# Patient Record
Sex: MALE | Race: WHITE | NOT HISPANIC OR LATINO | URBAN - METROPOLITAN AREA
[De-identification: names, ages, dates, MRNs, and addresses within clinical notes are randomized per-mention and may not be internally consistent; named-entity substitution may affect disease eponyms.]

---

## 2020-06-22 ENCOUNTER — OFFICE VISIT (OUTPATIENT)
Dept: URBAN - METROPOLITAN AREA CLINIC 63 | Facility: CLINIC | Age: 70
End: 2020-06-22

## 2020-10-05 ENCOUNTER — OFFICE VISIT (OUTPATIENT)
Dept: URBAN - METROPOLITAN AREA CLINIC 121 | Facility: CLINIC | Age: 70
End: 2020-10-05

## 2021-06-22 ENCOUNTER — LAB OUTSIDE AN ENCOUNTER (OUTPATIENT)
Dept: URBAN - METROPOLITAN AREA CLINIC 121 | Facility: CLINIC | Age: 71
End: 2021-06-22

## 2021-07-12 LAB
ABSOLUTE BASOPHILS: (no result)
ABSOLUTE EOSINOPHILS: (no result)
ABSOLUTE LYMPHOCYTES: (no result)
ABSOLUTE MONOCYTES: (no result)
ABSOLUTE NEUTROPHILS: (no result)
ALBUMIN/GLOBULIN RATIO: (no result)
ALBUMIN: (no result)
ALKALINE PHOSPHATASE: (no result)
ALT: (no result)
AST: (no result)
BASOPHILS: (no result)
BILIRUBIN, TOTAL: (no result)
BUN/CREATININE RATIO: (no result)
C-REACTIVE PROTEIN: (no result)
CALCIUM: (no result)
CARBON DIOXIDE: (no result)
CHLORIDE: (no result)
CREATININE: (no result)
EGFR AFRICAN AMERIC: (no result)
EGFR NON-AFR. AMERI: (no result)
EOSINOPHILS: (no result)
GLOBULIN: (no result)
GLUCOSE: (no result)
HEMATOCRIT: (no result)
HEMOGLOBIN: (no result)
LYMPHOCYTES: (no result)
MCH: (no result)
MCHC: (no result)
MCV: (no result)
MONOCYTES: (no result)
MPV: (no result)
NEUTROPHILS: (no result)
PLATELET COUNT: (no result)
POTASSIUM: (no result)
PROTEIN, TOTAL: (no result)
RDW: (no result)
RED BLOOD CELL COUNT: (no result)
SODIUM: (no result)
UREA NITROGEN (BUN): (no result)
WHITE BLOOD CELL COUNT: (no result)

## 2021-07-15 ENCOUNTER — OFFICE VISIT (OUTPATIENT)
Dept: URBAN - METROPOLITAN AREA CLINIC 63 | Facility: CLINIC | Age: 71
End: 2021-07-15

## 2021-08-18 ENCOUNTER — OFFICE VISIT (OUTPATIENT)
Dept: URBAN - METROPOLITAN AREA CLINIC 121 | Facility: CLINIC | Age: 71
End: 2021-08-18

## 2021-11-02 ENCOUNTER — OFFICE VISIT (OUTPATIENT)
Dept: URBAN - METROPOLITAN AREA SURGERY CENTER 4 | Facility: SURGERY CENTER | Age: 71
End: 2021-11-02

## 2021-11-04 LAB — PATHOLOGY (INDENTED REPORT): (no result)

## 2021-11-15 ENCOUNTER — OFFICE VISIT (OUTPATIENT)
Dept: URBAN - METROPOLITAN AREA CLINIC 60 | Facility: CLINIC | Age: 71
End: 2021-11-15

## 2021-11-16 ENCOUNTER — OFFICE VISIT (OUTPATIENT)
Dept: URBAN - METROPOLITAN AREA CLINIC 63 | Facility: CLINIC | Age: 71
End: 2021-11-16

## 2021-12-17 ENCOUNTER — OFFICE VISIT (OUTPATIENT)
Dept: URBAN - METROPOLITAN AREA SURGERY CENTER 4 | Facility: SURGERY CENTER | Age: 71
End: 2021-12-17

## 2021-12-23 LAB — PATHOLOGY (INDENTED REPORT): (no result)

## 2022-02-02 ENCOUNTER — OFFICE VISIT (OUTPATIENT)
Dept: URBAN - METROPOLITAN AREA MEDICAL CENTER 14 | Facility: MEDICAL CENTER | Age: 72
End: 2022-02-02

## 2022-02-18 ENCOUNTER — OFFICE VISIT (OUTPATIENT)
Dept: URBAN - METROPOLITAN AREA CLINIC 63 | Facility: CLINIC | Age: 72
End: 2022-02-18

## 2022-05-18 ENCOUNTER — OFFICE VISIT (OUTPATIENT)
Dept: URBAN - METROPOLITAN AREA CLINIC 63 | Facility: CLINIC | Age: 72
End: 2022-05-18

## 2022-06-13 LAB
ABSOLUTE BASOPHILS: (no result)
ABSOLUTE EOSINOPHILS: (no result)
ABSOLUTE LYMPHOCYTES: (no result)
ABSOLUTE MONOCYTES: (no result)
ABSOLUTE NEUTROPHILS: (no result)
ALBUMIN/GLOBULIN RATIO: (no result)
ALBUMIN: (no result)
ALKALINE PHOSPHATASE: (no result)
ALT: (no result)
AST: (no result)
BASOPHILS: (no result)
BILIRUBIN, TOTAL: (no result)
BUN/CREATININE RATIO: (no result)
CALCIUM: (no result)
CARBON DIOXIDE: (no result)
CHLORIDE: (no result)
CREATININE: (no result)
EGFR AFRICAN AMERIC: (no result)
EGFR NON-AFR. AMERI: (no result)
EOSINOPHILS: (no result)
GLOBULIN: (no result)
GLUCOSE: (no result)
HEMATOCRIT: (no result)
HEMOGLOBIN: (no result)
HEPATITIS A AB, TOTAL: (no result)
HEPATITIS A IGM: (no result)
HEPATITIS B CORE AB TOTAL: (no result)
HEPATITIS B CORE ANTIBODY (IGM): (no result)
HEPATITIS B SURFACE ANTIBODY QL: (no result)
HEPATITIS B SURFACE ANTIGEN: (no result)
HEPATITIS C ANTIBODY: (no result)
INDEX: 0.22
INR: 1.2
LYMPHOCYTES: (no result)
MCH: (no result)
MCHC: (no result)
MCV: (no result)
MITOCHONDRIAL AB SCREEN: NEGATIVE
MONOCYTES: (no result)
MPV: (no result)
NEUTROPHILS: (no result)
PLATELET COUNT: (no result)
POTASSIUM: (no result)
PROTEIN, TOTAL: (no result)
PT: (no result)
RDW: (no result)
RED BLOOD CELL COUNT: (no result)
SMOOTH MUSCLE AB SCREEN: NEGATIVE
SODIUM: (no result)
UREA NITROGEN (BUN): (no result)
WHITE BLOOD CELL COUNT: (no result)

## 2022-06-16 ENCOUNTER — OFFICE VISIT (OUTPATIENT)
Dept: URBAN - METROPOLITAN AREA CLINIC 63 | Facility: CLINIC | Age: 72
End: 2022-06-16

## 2022-07-09 ENCOUNTER — TELEPHONE ENCOUNTER (OUTPATIENT)
Dept: URBAN - METROPOLITAN AREA CLINIC 121 | Facility: CLINIC | Age: 72
End: 2022-07-09

## 2022-07-09 RX ORDER — LISINOPRIL 40 MG/1
TABLET ORAL TWICE A DAY
Refills: 0 | OUTPATIENT
Start: 2022-02-18 | End: 2022-06-16

## 2022-07-09 RX ORDER — AMLODIPINE BESYLATE 5 MG/1
TABLET ORAL ONCE A DAY
Refills: 0 | OUTPATIENT
Start: 2022-02-18 | End: 2022-06-16

## 2022-07-09 RX ORDER — ASPIRIN 81 MG/1
TABLET, FILM COATED ORAL
Refills: 0 | OUTPATIENT
Start: 2019-05-07 | End: 2019-12-02

## 2022-07-09 RX ORDER — SUCRALFATE 1 G/10ML
PLEASE GIVE LIQUID FORM SUSPENSION ORAL
Refills: 1 | OUTPATIENT
Start: 2012-08-06 | End: 2012-10-15

## 2022-07-09 RX ORDER — ASPIRIN 81 MG/1
TABLET, COATED ORAL ONCE A DAY
Refills: 0 | OUTPATIENT
Start: 2012-10-19 | End: 2014-03-05

## 2022-07-09 RX ORDER — METOPROLOL SUCCINATE 25 MG/1
TABLET, EXTENDED RELEASE ORAL TWICE A DAY
Refills: 0 | OUTPATIENT
Start: 2019-05-07 | End: 2019-12-02

## 2022-07-09 RX ORDER — SUCRALFATE 1 G/10ML
PLEASE GIVE LIQUID FORM SUSPENSION ORAL
Refills: 1 | OUTPATIENT
Start: 2012-10-15 | End: 2013-04-05

## 2022-07-09 RX ORDER — CHLORHEXIDINE GLUCONATE 4 %
LIQUID (ML) TOPICAL ONCE A DAY
Refills: 0 | OUTPATIENT
Start: 2019-01-07 | End: 2019-03-11

## 2022-07-09 RX ORDER — LISINOPRIL 5 MG/1
TABLET ORAL ONCE A DAY
Refills: 0 | OUTPATIENT
Start: 2019-03-11 | End: 2019-05-07

## 2022-07-09 RX ORDER — TRAMADOL HYDROCHLORIDE 50 MG/1
TABLET ORAL TWICE A DAY
Refills: 0 | OUTPATIENT
Start: 2019-05-07 | End: 2019-12-02

## 2022-07-09 RX ORDER — FAMOTIDINE 20 MG/1
1 EVERY BEDTIME TABLET ORAL
Refills: 2 | OUTPATIENT
Start: 2021-11-16 | End: 2022-01-22

## 2022-07-09 RX ORDER — ASPIRIN 81 MG/1
TABLET, FILM COATED ORAL
Refills: 0 | OUTPATIENT
Start: 2019-03-11 | End: 2019-05-07

## 2022-07-09 RX ORDER — LISINOPRIL 5 MG/1
TABLET ORAL ONCE A DAY
Refills: 0 | OUTPATIENT
Start: 2018-11-19 | End: 2019-01-07

## 2022-07-09 RX ORDER — OMEPRAZOLE 20 MG/1
TAKE 1 CAPSULE ONCE A DAY 30 MINUTES BEFORE BREAKFAST CAPSULE, DELAYED RELEASE ORAL
Refills: 3 | OUTPATIENT
Start: 2020-07-06 | End: 2021-05-27

## 2022-07-09 RX ORDER — LISINOPRIL 5 MG/1
TABLET ORAL ONCE A DAY
Refills: 0 | OUTPATIENT
Start: 2019-12-02 | End: 2020-06-22

## 2022-07-09 RX ORDER — AMLODIPINE BESYLATE 5 MG/1
TABLET ORAL
Refills: 0 | OUTPATIENT
Start: 2019-12-02 | End: 2021-11-16

## 2022-07-09 RX ORDER — CHLORHEXIDINE GLUCONATE 4 %
LIQUID (ML) TOPICAL ONCE A DAY
Refills: 0 | OUTPATIENT
Start: 2015-01-07 | End: 2018-11-19

## 2022-07-09 RX ORDER — PANTOPRAZOLE SODIUM 40 MG/1
TABLET, DELAYED RELEASE ORAL TWICE A DAY
Refills: 1 | OUTPATIENT
Start: 2012-10-10 | End: 2012-11-14

## 2022-07-09 RX ORDER — TRAMADOL HYDROCHLORIDE 50 MG/1
TABLET ORAL AS NEEDED
Refills: 0 | OUTPATIENT
Start: 2022-06-16 | End: 2022-06-16

## 2022-07-09 RX ORDER — ATORVASTATIN CALCIUM 20 MG/1
TABLET, FILM COATED ORAL ONCE A DAY
Refills: 0 | OUTPATIENT
Start: 2021-11-16 | End: 2022-02-18

## 2022-07-09 RX ORDER — OMEPRAZOLE 20 MG/1
ONCE A DAY CAPSULE, DELAYED RELEASE ORAL ONCE A DAY
Refills: 0 | OUTPATIENT
Start: 2021-05-27 | End: 2021-06-28

## 2022-07-09 RX ORDER — ASPIRIN 81 MG/1
TABLET, FILM COATED ORAL
Refills: 0 | OUTPATIENT
Start: 2019-01-07 | End: 2019-03-11

## 2022-07-09 RX ORDER — LISINOPRIL 40 MG/1
TABLET ORAL TWICE A DAY
Refills: 0 | OUTPATIENT
Start: 2021-07-15 | End: 2021-11-16

## 2022-07-09 RX ORDER — AMLODIPINE BESYLATE 5 MG/1
TABLET ORAL
Refills: 0 | OUTPATIENT
Start: 2019-03-11 | End: 2019-05-07

## 2022-07-09 RX ORDER — METOPROLOL SUCCINATE 25 MG/1
TABLET, EXTENDED RELEASE ORAL TWICE A DAY
Refills: 0 | OUTPATIENT
Start: 2019-03-11 | End: 2019-05-07

## 2022-07-09 RX ORDER — LISINOPRIL 40 MG/1
TABLET ORAL TWICE A DAY
Refills: 0 | OUTPATIENT
Start: 2021-11-16 | End: 2022-02-18

## 2022-07-09 RX ORDER — ASPIRIN 81 MG/1
TABLET, FILM COATED ORAL
Refills: 0 | OUTPATIENT
Start: 2018-11-19 | End: 2019-01-07

## 2022-07-09 RX ORDER — LISINOPRIL 5 MG/1
TABLET ORAL
Refills: 0 | OUTPATIENT
Start: 2014-04-03 | End: 2015-01-07

## 2022-07-09 RX ORDER — METOPROLOL SUCCINATE 50 MG/1
TABLET, EXTENDED RELEASE ORAL TWICE A DAY
Refills: 0 | OUTPATIENT
Start: 2021-11-16 | End: 2022-02-18

## 2022-07-09 RX ORDER — METOPROLOL SUCCINATE 50 MG/1
TABLET, EXTENDED RELEASE ORAL TWICE A DAY
Refills: 0 | OUTPATIENT
Start: 2021-07-15 | End: 2021-07-15

## 2022-07-09 RX ORDER — LISINOPRIL 5 MG/1
TABLET ORAL ONCE A DAY
Refills: 0 | OUTPATIENT
Start: 2019-01-07 | End: 2019-03-11

## 2022-07-09 RX ORDER — ATORVASTATIN CALCIUM 20 MG/1
TABLET, FILM COATED ORAL ONCE A DAY
Refills: 0 | OUTPATIENT
Start: 2021-05-11 | End: 2021-11-16

## 2022-07-09 RX ORDER — TRAMADOL HYDROCHLORIDE 50 MG/1
TABLET ORAL AS NEEDED
Refills: 0 | OUTPATIENT
Start: 2022-02-18 | End: 2022-06-16

## 2022-07-09 RX ORDER — TRAZODONE HYDROCHLORIDE 50 MG/1
TABLET ORAL TWICE A DAY
Refills: 0 | OUTPATIENT
Start: 2021-07-15 | End: 2021-11-16

## 2022-07-09 RX ORDER — TRAMADOL HYDROCHLORIDE 50 MG/1
TABLET ORAL AS NEEDED
Refills: 0 | OUTPATIENT
Start: 2021-11-16 | End: 2022-02-18

## 2022-07-09 RX ORDER — METOPROLOL SUCCINATE 50 MG/1
TABLET, EXTENDED RELEASE ORAL TWICE A DAY
Refills: 0 | OUTPATIENT
Start: 2020-06-22 | End: 2021-07-15

## 2022-07-09 RX ORDER — LISINOPRIL 40 MG/1
TABLET ORAL TWICE A DAY
Refills: 0 | OUTPATIENT
Start: 2021-07-15 | End: 2021-07-15

## 2022-07-09 RX ORDER — TESTOSTERONE 10 MG/G
GEL TRANSDERMAL
Refills: 0 | OUTPATIENT
Start: 2014-03-05 | End: 2015-01-07

## 2022-07-09 RX ORDER — PANTOPRAZOLE SODIUM 40 MG/1
GRANULE, DELAYED RELEASE ORAL
Refills: 0 | OUTPATIENT
Start: 2014-03-05 | End: 2015-01-07

## 2022-07-09 RX ORDER — AMLODIPINE BESYLATE 5 MG/1
TABLET ORAL
Refills: 0 | OUTPATIENT
Start: 2018-11-19 | End: 2019-01-07

## 2022-07-09 RX ORDER — GABAPENTIN 300 MG/1
CAPSULE ORAL THREE TIMES A DAY
Refills: 0 | OUTPATIENT
Start: 2021-07-15 | End: 2021-11-16

## 2022-07-09 RX ORDER — METOPROLOL SUCCINATE 50 MG/1
TABLET, EXTENDED RELEASE ORAL TWICE A DAY
Refills: 0 | OUTPATIENT
Start: 2022-02-18 | End: 2022-06-16

## 2022-07-09 RX ORDER — ASPIRIN 81 MG/1
TABLET, FILM COATED ORAL ONCE A DAY
Refills: 0 | OUTPATIENT
Start: 2022-02-18 | End: 2022-06-16

## 2022-07-09 RX ORDER — PANTOPRAZOLE SODIUM 40 MG/1
TABLET, DELAYED RELEASE ORAL TWICE A DAY
Refills: 3 | OUTPATIENT
Start: 2012-11-14 | End: 2013-12-04

## 2022-07-09 RX ORDER — ASPIRIN 81 MG/1
TABLET, COATED ORAL ONCE A DAY
Refills: 0 | OUTPATIENT
Start: 2015-01-07 | End: 2019-03-11

## 2022-07-09 RX ORDER — ASPIRIN 81 MG/1
TABLET, FILM COATED ORAL
Refills: 0 | OUTPATIENT
Start: 2019-12-02 | End: 2021-07-15

## 2022-07-09 RX ORDER — METOPROLOL SUCCINATE 25 MG/1
TABLET, EXTENDED RELEASE ORAL TWICE A DAY
Refills: 0 | OUTPATIENT
Start: 2019-12-02 | End: 2020-06-22

## 2022-07-09 RX ORDER — FUROSEMIDE 20 MG/1
TABLET ORAL ONCE A DAY
Refills: 0 | OUTPATIENT
Start: 2022-02-18 | End: 2022-06-16

## 2022-07-09 RX ORDER — METOPROLOL SUCCINATE 50 MG/1
TABLET, EXTENDED RELEASE ORAL TWICE A DAY
Refills: 0 | OUTPATIENT
Start: 2021-07-15 | End: 2021-11-16

## 2022-07-09 RX ORDER — TRAZODONE HYDROCHLORIDE 50 MG/1
TABLET ORAL TWICE A DAY
Refills: 0 | OUTPATIENT
Start: 2019-12-02 | End: 2021-07-15

## 2022-07-09 RX ORDER — SIMVASTATIN 20 MG/1
TABLET, FILM COATED ORAL ONCE A DAY
Refills: 0 | OUTPATIENT
Start: 2012-09-23 | End: 2014-03-05

## 2022-07-09 RX ORDER — SIMVASTATIN 40 MG/1
TABLET, FILM COATED ORAL ONCE A DAY
Refills: 0 | OUTPATIENT
Start: 2015-01-07 | End: 2019-03-11

## 2022-07-09 RX ORDER — OMEPRAZOLE 40 MG/1
TWICE A DAY CAPSULE, DELAYED RELEASE ORAL TWICE A DAY
Refills: 2 | OUTPATIENT
Start: 2018-12-11 | End: 2021-11-16

## 2022-07-09 RX ORDER — OMEPRAZOLE 20 MG/1
ONCE A DAY 30MIN BEFORE BREAKFAST TABLET, DELAYED RELEASE ORAL ONCE A DAY
Refills: 1 | OUTPATIENT
Start: 2019-04-23 | End: 2019-04-23

## 2022-07-09 RX ORDER — TRAZODONE HYDROCHLORIDE 50 MG/1
TABLET ORAL ONCE A DAY
Refills: 0 | OUTPATIENT
Start: 2022-06-16 | End: 2022-06-16

## 2022-07-09 RX ORDER — TRAMADOL HYDROCHLORIDE 50 MG/1
TABLET ORAL TWICE A DAY
Refills: 0 | OUTPATIENT
Start: 2019-12-02 | End: 2020-06-22

## 2022-07-09 RX ORDER — LISINOPRIL 40 MG/1
TABLET ORAL TWICE A DAY
Refills: 0 | OUTPATIENT
Start: 2020-06-22 | End: 2021-07-15

## 2022-07-09 RX ORDER — TRAMADOL HYDROCHLORIDE 50 MG/1
TABLET ORAL TWICE A DAY
Refills: 0 | OUTPATIENT
Start: 2019-03-11 | End: 2019-05-07

## 2022-07-09 RX ORDER — GABAPENTIN 300 MG/1
CAPSULE ORAL THREE TIMES A DAY
Refills: 0 | OUTPATIENT
Start: 2022-02-18 | End: 2022-06-16

## 2022-07-09 RX ORDER — AMLODIPINE BESYLATE 5 MG/1
TABLET ORAL
Refills: 0 | OUTPATIENT
Start: 2019-05-07 | End: 2019-12-02

## 2022-07-09 RX ORDER — CHLORHEXIDINE GLUCONATE 4 %
LIQUID (ML) TOPICAL ONCE A DAY
Refills: 1 | OUTPATIENT
Start: 2012-10-19 | End: 2012-10-19

## 2022-07-09 RX ORDER — LISINOPRIL 5 MG/1
TABLET ORAL ONCE A DAY
Refills: 0 | OUTPATIENT
Start: 2019-05-07 | End: 2019-12-02

## 2022-07-09 RX ORDER — GABAPENTIN 300 MG/1
CAPSULE ORAL THREE TIMES A DAY
Refills: 0 | OUTPATIENT
Start: 2019-03-11 | End: 2019-05-07

## 2022-07-09 RX ORDER — CHLORHEXIDINE GLUCONATE 4 %
LIQUID (ML) TOPICAL ONCE A DAY
Refills: 0 | OUTPATIENT
Start: 2018-11-19 | End: 2019-01-07

## 2022-07-09 RX ORDER — MELOXICAM 15 MG/1
TABLET ORAL ONCE A DAY
Refills: 0 | OUTPATIENT
Start: 2021-11-16 | End: 2022-02-18

## 2022-07-09 RX ORDER — AMLODIPINE BESYLATE 5 MG/1
TABLET ORAL ONCE A DAY
Refills: 0 | OUTPATIENT
Start: 2021-11-16 | End: 2022-02-18

## 2022-07-09 RX ORDER — LISINOPRIL 5 MG/1
TABLET ORAL ONCE A DAY
Refills: 0 | OUTPATIENT
Start: 2015-01-07 | End: 2018-11-19

## 2022-07-09 RX ORDER — TRAZODONE HYDROCHLORIDE 50 MG/1
TABLET ORAL ONCE A DAY
Refills: 0 | OUTPATIENT
Start: 2021-11-16 | End: 2022-02-18

## 2022-07-09 RX ORDER — ASPIRIN 81 MG/1
TABLET, FILM COATED ORAL
Refills: 0 | OUTPATIENT
Start: 2021-11-16 | End: 2022-02-18

## 2022-07-09 RX ORDER — SIMVASTATIN 40 MG/1
TABLET, FILM COATED ORAL
Refills: 0 | OUTPATIENT
Start: 2014-03-05 | End: 2015-01-07

## 2022-07-09 RX ORDER — METOPROLOL SUCCINATE 25 MG/1
TABLET, EXTENDED RELEASE ORAL TWICE A DAY
Refills: 0 | OUTPATIENT
Start: 2018-11-19 | End: 2019-01-07

## 2022-07-09 RX ORDER — PANTOPRAZOLE SODIUM 40 MG/1
GRANULE, DELAYED RELEASE ORAL ONCE A DAY
Refills: 0 | OUTPATIENT
Start: 2015-01-07 | End: 2019-03-11

## 2022-07-09 RX ORDER — TRAZODONE HYDROCHLORIDE 50 MG/1
TABLET ORAL ONCE A DAY
Refills: 0 | OUTPATIENT
Start: 2022-02-18 | End: 2022-06-16

## 2022-07-09 RX ORDER — OMEPRAZOLE 20 MG/1
ONCE A DAY CAPSULE, DELAYED RELEASE ORAL ONCE A DAY
Refills: 11 | OUTPATIENT
Start: 2021-07-26 | End: 2022-05-31

## 2022-07-09 RX ORDER — METOPROLOL SUCCINATE 25 MG/1
TABLET, EXTENDED RELEASE ORAL ONCE A DAY
Refills: 0 | OUTPATIENT
Start: 2012-09-23 | End: 2014-03-05

## 2022-07-09 RX ORDER — ASPIRIN 81 MG/1
TABLET, FILM COATED ORAL
Refills: 0 | OUTPATIENT
Start: 2021-07-15 | End: 2021-11-16

## 2022-07-09 RX ORDER — MELOXICAM 15 MG/1
TABLET ORAL ONCE A DAY
Refills: 0 | OUTPATIENT
Start: 2022-02-18 | End: 2022-06-16

## 2022-07-09 RX ORDER — GABAPENTIN 300 MG/1
CAPSULE ORAL THREE TIMES A DAY
Refills: 0 | OUTPATIENT
Start: 2019-05-07 | End: 2019-12-02

## 2022-07-09 RX ORDER — GABAPENTIN 300 MG/1
CAPSULE ORAL THREE TIMES A DAY
Refills: 0 | OUTPATIENT
Start: 2019-12-02 | End: 2021-07-15

## 2022-07-09 RX ORDER — CHROMIUM 200 MCG
TABLET ORAL
Refills: 0 | OUTPATIENT
Start: 2014-03-05 | End: 2015-01-07

## 2022-07-09 RX ORDER — AMLODIPINE BESYLATE 5 MG/1
TABLET ORAL
Refills: 0 | OUTPATIENT
Start: 2019-01-07 | End: 2019-03-11

## 2022-07-09 RX ORDER — METOPROLOL SUCCINATE 25 MG/1
TABLET, EXTENDED RELEASE ORAL TWICE A DAY
Refills: 0 | OUTPATIENT
Start: 2019-01-07 | End: 2019-03-11

## 2022-07-09 RX ORDER — ASPIRIN 81 MG/1
TABLET, COATED ORAL ONCE A DAY
Refills: 0 | OUTPATIENT
Start: 2014-03-05 | End: 2015-01-07

## 2022-07-09 RX ORDER — METOPROLOL SUCCINATE 25 MG/1
TABLET, EXTENDED RELEASE ORAL TWICE A DAY
Refills: 0 | OUTPATIENT
Start: 2015-01-07 | End: 2018-11-19

## 2022-07-09 RX ORDER — ZALEPLON 10 MG/1
CAPSULE ORAL ONCE A DAY
Refills: 0 | OUTPATIENT
Start: 2012-08-20 | End: 2014-03-05

## 2022-07-09 RX ORDER — METOPROLOL SUCCINATE 25 MG/1
TABLET, EXTENDED RELEASE ORAL
Refills: 0 | OUTPATIENT
Start: 2014-03-05 | End: 2015-01-07

## 2022-07-09 RX ORDER — GABAPENTIN 300 MG/1
CAPSULE ORAL THREE TIMES A DAY
Refills: 0 | OUTPATIENT
Start: 2021-11-16 | End: 2022-02-18

## 2022-07-09 RX ORDER — OMEPRAZOLE 20 MG/1
TAKE 1 CAPSULE ONCE A DAY 30 MINUTES BEFORE BREAKFAST CAPSULE, DELAYED RELEASE ORAL
Refills: 1 | OUTPATIENT
Start: 2019-08-30 | End: 2020-07-06

## 2022-07-09 RX ORDER — UBIDECARENONE/VIT E ACET 100MG-5
CAPSULE ORAL TWICE A DAY
Refills: 0 | OUTPATIENT
Start: 2015-01-07 | End: 2019-03-11

## 2022-07-09 RX ORDER — OMEPRAZOLE 20 MG/1
ONCE A DAY CAPSULE, DELAYED RELEASE ORAL ONCE A DAY
Refills: 0 | OUTPATIENT
Start: 2021-06-28 | End: 2021-07-26

## 2022-07-09 RX ORDER — CHLORHEXIDINE GLUCONATE 4 %
LIQUID (ML) TOPICAL ONCE A DAY
Refills: 2 | OUTPATIENT
Start: 2012-11-14 | End: 2013-02-01

## 2022-07-09 RX ORDER — OMEPRAZOLE 20 MG/1
ONCE A DAY 30MIN BEFORE BREAKFAST TABLET, DELAYED RELEASE ORAL ONCE A DAY
Refills: 0 | OUTPATIENT
Start: 2019-05-20 | End: 2019-08-30

## 2022-07-09 RX ORDER — OMEPRAZOLE 20 MG/1
TWICE A DAY TABLET, DELAYED RELEASE ORAL TWICE A DAY
Refills: 2 | OUTPATIENT
Start: 2018-11-19 | End: 2020-06-22

## 2022-07-10 ENCOUNTER — TELEPHONE ENCOUNTER (OUTPATIENT)
Dept: URBAN - METROPOLITAN AREA CLINIC 121 | Facility: CLINIC | Age: 72
End: 2022-07-10

## 2022-07-10 RX ORDER — NAPROXEN SODIUM 220 MG
TAKE ONE CAPSULE BY MOUTH ONCE A DAY TABLET ORAL ONCE A DAY
Refills: 1 | Status: ACTIVE | COMMUNITY
Start: 2019-01-07

## 2022-07-10 RX ORDER — OMEPRAZOLE 20 MG/1
TAKE 1 CAPSULE BY MOUTH DAILY 30 MINS BEFORE BREAKFAST CAPSULE, DELAYED RELEASE ORAL
Refills: 3 | Status: ACTIVE | COMMUNITY
Start: 2022-05-31

## 2022-07-10 RX ORDER — OMEPRAZOLE 20 MG/1
TAKE ONE CAPSULE PO ONCE DAILY 30 MINUTES BEFORE BREAKFAST CAPSULE, DELAYED RELEASE ORAL
Refills: 1 | Status: ACTIVE | COMMUNITY
Start: 2020-06-22

## 2022-07-10 RX ORDER — CHLORHEXIDINE GLUCONATE 4 %
ONCE A DAY LIQUID (ML) TOPICAL ONCE A DAY
Refills: 3 | Status: ACTIVE | COMMUNITY
Start: 2013-02-01

## 2022-07-10 RX ORDER — LISINOPRIL 40 MG/1
TABLET ORAL TWICE A DAY
Refills: 0 | Status: ACTIVE | COMMUNITY
Start: 2022-06-16

## 2022-07-10 RX ORDER — FUROSEMIDE 20 MG/1
TABLET ORAL ONCE A DAY
Refills: 0 | Status: ACTIVE | COMMUNITY
Start: 2022-06-16

## 2022-07-10 RX ORDER — AMLODIPINE BESYLATE 5 MG/1
TABLET ORAL ONCE A DAY
Refills: 0 | Status: ACTIVE | COMMUNITY
Start: 2022-06-16

## 2022-07-10 RX ORDER — SUCRALFATE 1 G/10ML
USE 10 ML 4 TIMES A DAY AS DIRECTED SUSPENSION ORAL
Refills: 2 | Status: ACTIVE | COMMUNITY
Start: 2013-04-05

## 2022-07-10 RX ORDER — OMEPRAZOLE 40 MG/1
TAKE ONCE IN THE MORNING -30 MINUTES BEFORE BREAKFAST CAPSULE, DELAYED RELEASE ORAL ONCE A DAY
Refills: 2 | Status: ACTIVE | COMMUNITY
Start: 2018-11-28

## 2022-07-10 RX ORDER — MELOXICAM 15 MG/1
TABLET ORAL ONCE A DAY
Refills: 0 | Status: ACTIVE | COMMUNITY
Start: 2022-06-16

## 2022-07-10 RX ORDER — FAMOTIDINE 40 MG/1
1 EVERY BEDTIME TABLET, FILM COATED ORAL
Refills: 3 | Status: ACTIVE | COMMUNITY
Start: 2022-02-18

## 2022-07-10 RX ORDER — ATORVASTATIN CALCIUM 20 MG/1
TABLET, FILM COATED ORAL ONCE A DAY
Refills: 0 | Status: ACTIVE | COMMUNITY
Start: 2022-06-16

## 2022-07-10 RX ORDER — POLYETHYLENE GLYCOL 3350, SODIUM SULFATE ANHYDROUS, SODIUM BICARBONATE, SODIUM CHLORIDE, POTASSIUM CHLORIDE 236; 22.74; 6.74; 5.86; 2.97 G/4L; G/4L; G/4L; G/4L; G/4L
TAKE AS DIRECTED POWDER, FOR SOLUTION ORAL TAKE AS DIRECTED
Refills: 0 | Status: ACTIVE | COMMUNITY
Start: 2021-11-16

## 2022-07-10 RX ORDER — GABAPENTIN 300 MG/1
CAPSULE ORAL THREE TIMES A DAY
Refills: 0 | Status: ACTIVE | COMMUNITY
Start: 2022-06-16

## 2022-07-10 RX ORDER — ONDANSETRON HYDROCHLORIDE 4 MG/2ML
USE AS DIRECTED Q4-6 HOURS PRN INJECTION, SOLUTION INTRAMUSCULAR; INTRAVENOUS
Refills: 0 | Status: ACTIVE | COMMUNITY
Start: 2021-11-16

## 2022-07-10 RX ORDER — PANTOPRAZOLE SODIUM 40 MG/1
TWICE A DAY TABLET, DELAYED RELEASE ORAL
Refills: 2 | Status: ACTIVE | COMMUNITY
Start: 2013-12-04

## 2022-07-10 RX ORDER — ATORVASTATIN CALCIUM 20 MG/1
TABLET, FILM COATED ORAL ONCE A DAY
Refills: 0 | Status: ACTIVE | COMMUNITY
Start: 2022-02-18

## 2022-07-10 RX ORDER — ONDANSETRON HYDROCHLORIDE 4 MG/2ML
USE AS DIRECTED Q4-6 HOURS PRN INJECTION, SOLUTION INTRAMUSCULAR; INTRAVENOUS
Refills: 0 | Status: ACTIVE | COMMUNITY
Start: 2021-11-30

## 2022-07-10 RX ORDER — METOPROLOL SUCCINATE 50 MG/1
TABLET, EXTENDED RELEASE ORAL TWICE A DAY
Refills: 0 | Status: ACTIVE | COMMUNITY
Start: 2022-06-16

## 2022-07-10 RX ORDER — ASPIRIN 81 MG/1
TABLET, FILM COATED ORAL ONCE A DAY
Refills: 0 | Status: ACTIVE | COMMUNITY
Start: 2022-06-16

## 2022-07-10 RX ORDER — FAMOTIDINE 20 MG/1
1 EVERY BEDTIME TABLET ORAL
Refills: 3 | Status: ACTIVE | COMMUNITY
Start: 2022-01-22

## 2022-08-17 ENCOUNTER — TELEPHONE ENCOUNTER (OUTPATIENT)
Dept: URBAN - METROPOLITAN AREA CLINIC 63 | Facility: CLINIC | Age: 72
End: 2022-08-17

## 2022-10-05 ENCOUNTER — OFFICE VISIT (OUTPATIENT)
Dept: URBAN - METROPOLITAN AREA MEDICAL CENTER 14 | Facility: MEDICAL CENTER | Age: 72
End: 2022-10-05

## 2022-11-02 ENCOUNTER — OFFICE VISIT (OUTPATIENT)
Dept: URBAN - METROPOLITAN AREA MEDICAL CENTER 14 | Facility: MEDICAL CENTER | Age: 72
End: 2022-11-02
Payer: MEDICARE

## 2022-11-02 DIAGNOSIS — K31.7 POLYP OF DUODENUM: ICD-10-CM

## 2022-11-02 DIAGNOSIS — K22.9 IRREGULAR Z LINE OF ESOPHAGUS: ICD-10-CM

## 2022-11-02 DIAGNOSIS — K44.9 HIATAL HERNIA: ICD-10-CM

## 2022-11-02 PROCEDURE — 43239 EGD BIOPSY SINGLE/MULTIPLE: CPT | Performed by: INTERNAL MEDICINE

## 2022-11-02 RX ORDER — FAMOTIDINE 20 MG/1
1 EVERY BEDTIME TABLET ORAL
Refills: 3 | Status: ACTIVE | COMMUNITY
Start: 2022-01-22

## 2022-11-02 RX ORDER — GABAPENTIN 300 MG/1
CAPSULE ORAL THREE TIMES A DAY
Refills: 0 | Status: ACTIVE | COMMUNITY
Start: 2022-06-16

## 2022-11-02 RX ORDER — CHLORHEXIDINE GLUCONATE 4 %
ONCE A DAY LIQUID (ML) TOPICAL ONCE A DAY
Refills: 3 | Status: ACTIVE | COMMUNITY
Start: 2013-02-01

## 2022-11-02 RX ORDER — PANTOPRAZOLE SODIUM 40 MG/1
TWICE A DAY TABLET, DELAYED RELEASE ORAL
Refills: 2 | Status: ACTIVE | COMMUNITY
Start: 2013-12-04

## 2022-11-02 RX ORDER — POLYETHYLENE GLYCOL 3350, SODIUM SULFATE ANHYDROUS, SODIUM BICARBONATE, SODIUM CHLORIDE, POTASSIUM CHLORIDE 236; 22.74; 6.74; 5.86; 2.97 G/4L; G/4L; G/4L; G/4L; G/4L
TAKE AS DIRECTED POWDER, FOR SOLUTION ORAL TAKE AS DIRECTED
Refills: 0 | Status: ACTIVE | COMMUNITY
Start: 2021-11-16

## 2022-11-02 RX ORDER — ASPIRIN 81 MG/1
TABLET, FILM COATED ORAL ONCE A DAY
Refills: 0 | Status: ACTIVE | COMMUNITY
Start: 2022-06-16

## 2022-11-02 RX ORDER — ATORVASTATIN CALCIUM 20 MG/1
TABLET, FILM COATED ORAL ONCE A DAY
Refills: 0 | Status: ACTIVE | COMMUNITY
Start: 2022-02-18

## 2022-11-02 RX ORDER — FAMOTIDINE 40 MG/1
1 EVERY BEDTIME TABLET, FILM COATED ORAL
Refills: 3 | Status: ACTIVE | COMMUNITY
Start: 2022-02-18

## 2022-11-02 RX ORDER — MELOXICAM 15 MG/1
TABLET ORAL ONCE A DAY
Refills: 0 | Status: ACTIVE | COMMUNITY
Start: 2022-06-16

## 2022-11-02 RX ORDER — AMLODIPINE BESYLATE 5 MG/1
TABLET ORAL ONCE A DAY
Refills: 0 | Status: ACTIVE | COMMUNITY
Start: 2022-06-16

## 2022-11-02 RX ORDER — OMEPRAZOLE 40 MG/1
TAKE ONCE IN THE MORNING -30 MINUTES BEFORE BREAKFAST CAPSULE, DELAYED RELEASE ORAL ONCE A DAY
Refills: 2 | Status: ACTIVE | COMMUNITY
Start: 2018-11-28

## 2022-11-02 RX ORDER — NAPROXEN SODIUM 220 MG
TAKE ONE CAPSULE BY MOUTH ONCE A DAY TABLET ORAL ONCE A DAY
Refills: 1 | Status: ACTIVE | COMMUNITY
Start: 2019-01-07

## 2022-11-02 RX ORDER — OMEPRAZOLE 20 MG/1
TAKE ONE CAPSULE PO ONCE DAILY 30 MINUTES BEFORE BREAKFAST CAPSULE, DELAYED RELEASE ORAL
Refills: 1 | Status: ACTIVE | COMMUNITY
Start: 2020-06-22

## 2022-11-02 RX ORDER — METOPROLOL SUCCINATE 50 MG/1
TABLET, EXTENDED RELEASE ORAL TWICE A DAY
Refills: 0 | Status: ACTIVE | COMMUNITY
Start: 2022-06-16

## 2022-11-02 RX ORDER — LISINOPRIL 40 MG/1
TABLET ORAL TWICE A DAY
Refills: 0 | Status: ACTIVE | COMMUNITY
Start: 2022-06-16

## 2022-11-02 RX ORDER — ONDANSETRON HYDROCHLORIDE 4 MG/2ML
USE AS DIRECTED Q4-6 HOURS PRN INJECTION, SOLUTION INTRAMUSCULAR; INTRAVENOUS
Refills: 0 | Status: ACTIVE | COMMUNITY
Start: 2021-11-16

## 2022-11-02 RX ORDER — SUCRALFATE 1 G/10ML
USE 10 ML 4 TIMES A DAY AS DIRECTED SUSPENSION ORAL
Refills: 2 | Status: ACTIVE | COMMUNITY
Start: 2013-04-05

## 2022-11-02 RX ORDER — FUROSEMIDE 20 MG/1
TABLET ORAL ONCE A DAY
Refills: 0 | Status: ACTIVE | COMMUNITY
Start: 2022-06-16

## 2022-11-08 PROBLEM — 73861008 POLYP OF DUODENUM: Status: ACTIVE | Noted: 2022-11-08

## 2022-11-15 PROBLEM — 266435005: Status: ACTIVE | Noted: 2022-11-15

## 2022-11-16 ENCOUNTER — LAB OUTSIDE AN ENCOUNTER (OUTPATIENT)
Dept: URBAN - METROPOLITAN AREA CLINIC 63 | Facility: CLINIC | Age: 72
End: 2022-11-16

## 2022-11-16 ENCOUNTER — OFFICE VISIT (OUTPATIENT)
Dept: URBAN - METROPOLITAN AREA CLINIC 63 | Facility: CLINIC | Age: 72
End: 2022-11-16

## 2022-11-16 ENCOUNTER — WEB ENCOUNTER (OUTPATIENT)
Dept: URBAN - METROPOLITAN AREA CLINIC 63 | Facility: CLINIC | Age: 72
End: 2022-11-16

## 2022-11-16 ENCOUNTER — OFFICE VISIT (OUTPATIENT)
Dept: URBAN - METROPOLITAN AREA CLINIC 63 | Facility: CLINIC | Age: 72
End: 2022-11-16
Payer: MEDICARE

## 2022-11-16 VITALS
HEART RATE: 53 BPM | OXYGEN SATURATION: 95 % | TEMPERATURE: 96.3 F | DIASTOLIC BLOOD PRESSURE: 80 MMHG | WEIGHT: 231 LBS | HEIGHT: 71 IN | SYSTOLIC BLOOD PRESSURE: 120 MMHG | BODY MASS INDEX: 32.34 KG/M2

## 2022-11-16 DIAGNOSIS — K76.0 FATTY LIVER: ICD-10-CM

## 2022-11-16 DIAGNOSIS — Z86.010 HX OF COLONIC POLYP: ICD-10-CM

## 2022-11-16 PROBLEM — 197321007: Status: ACTIVE | Noted: 2022-11-15

## 2022-11-16 PROCEDURE — 99213 OFFICE O/P EST LOW 20 MIN: CPT | Performed by: INTERNAL MEDICINE

## 2022-11-16 RX ORDER — METOPROLOL SUCCINATE 50 MG/1
TABLET, EXTENDED RELEASE ORAL TWICE A DAY
Refills: 0 | Status: ACTIVE | COMMUNITY
Start: 2022-06-16

## 2022-11-16 RX ORDER — ATORVASTATIN CALCIUM 20 MG/1
TABLET, FILM COATED ORAL ONCE A DAY
Refills: 0 | Status: ACTIVE | COMMUNITY
Start: 2022-02-18

## 2022-11-16 RX ORDER — ONDANSETRON HYDROCHLORIDE 4 MG/2ML
USE AS DIRECTED Q4-6 HOURS PRN INJECTION, SOLUTION INTRAMUSCULAR; INTRAVENOUS
Refills: 0 | Status: ACTIVE | COMMUNITY
Start: 2021-11-16

## 2022-11-16 RX ORDER — NAPROXEN SODIUM 220 MG
TAKE ONE CAPSULE BY MOUTH ONCE A DAY TABLET ORAL ONCE A DAY
Refills: 1 | Status: ON HOLD | COMMUNITY
Start: 2019-01-07

## 2022-11-16 RX ORDER — SUCRALFATE 1 G/10ML
USE 10 ML 4 TIMES A DAY AS DIRECTED SUSPENSION ORAL
Refills: 2 | Status: ON HOLD | COMMUNITY
Start: 2013-04-05

## 2022-11-16 RX ORDER — FUROSEMIDE 20 MG/1
TABLET ORAL ONCE A DAY
Refills: 0 | Status: ACTIVE | COMMUNITY
Start: 2022-06-16

## 2022-11-16 RX ORDER — NAPROXEN SODIUM 220 MG
TAKE ONE CAPSULE BY MOUTH ONCE A DAY TABLET ORAL ONCE A DAY
Refills: 1 | Status: ACTIVE | COMMUNITY
Start: 2019-01-07

## 2022-11-16 RX ORDER — OMEPRAZOLE 20 MG/1
TAKE ONE CAPSULE PO ONCE DAILY 30 MINUTES BEFORE BREAKFAST CAPSULE, DELAYED RELEASE ORAL
Refills: 1 | Status: ACTIVE | COMMUNITY
Start: 2020-06-22

## 2022-11-16 RX ORDER — LISINOPRIL 40 MG/1
TABLET ORAL TWICE A DAY
Refills: 0 | Status: ACTIVE | COMMUNITY
Start: 2022-06-16

## 2022-11-16 RX ORDER — METOPROLOL SUCCINATE 50 MG/1
TABLET, EXTENDED RELEASE ORAL TWICE A DAY
OUTPATIENT
Start: 2022-06-16

## 2022-11-16 RX ORDER — ASPIRIN 81 MG/1
TABLET, FILM COATED ORAL ONCE A DAY
Refills: 0 | Status: ACTIVE | COMMUNITY
Start: 2022-06-16

## 2022-11-16 RX ORDER — AMLODIPINE BESYLATE 5 MG/1
TABLET ORAL ONCE A DAY
Refills: 0 | Status: ACTIVE | COMMUNITY
Start: 2022-06-16

## 2022-11-16 RX ORDER — PANTOPRAZOLE SODIUM 40 MG/1
TWICE A DAY TABLET, DELAYED RELEASE ORAL
Refills: 2 | Status: ACTIVE | COMMUNITY
Start: 2013-12-04

## 2022-11-16 RX ORDER — FAMOTIDINE 20 MG/1
1 EVERY BEDTIME TABLET ORAL
Refills: 3 | Status: ACTIVE | COMMUNITY
Start: 2022-01-22

## 2022-11-16 RX ORDER — POLYETHYLENE GLYCOL 3350, SODIUM SULFATE ANHYDROUS, SODIUM BICARBONATE, SODIUM CHLORIDE, POTASSIUM CHLORIDE 236; 22.74; 6.74; 5.86; 2.97 G/4L; G/4L; G/4L; G/4L; G/4L
TAKE AS DIRECTED POWDER, FOR SOLUTION ORAL TAKE AS DIRECTED
Refills: 0 | Status: ACTIVE | COMMUNITY
Start: 2021-11-16

## 2022-11-16 RX ORDER — OMEPRAZOLE 40 MG/1
TAKE ONCE IN THE MORNING -30 MINUTES BEFORE BREAKFAST CAPSULE, DELAYED RELEASE ORAL ONCE A DAY
Refills: 2 | Status: ACTIVE | COMMUNITY
Start: 2018-11-28

## 2022-11-16 RX ORDER — SUCRALFATE 1 G/10ML
USE 10 ML 4 TIMES A DAY AS DIRECTED SUSPENSION ORAL
Refills: 2 | Status: ACTIVE | COMMUNITY
Start: 2013-04-05

## 2022-11-16 RX ORDER — CHLORHEXIDINE GLUCONATE 4 %
ONCE A DAY LIQUID (ML) TOPICAL ONCE A DAY
Refills: 3 | Status: ON HOLD | COMMUNITY
Start: 2013-02-01

## 2022-11-16 RX ORDER — POLYETHYLENE GLYCOL-3350, SODIUM CHLORIDE, POTASSIUM CHLORIDE AND SODIUM BICARBONATE 420; 11.2; 5.72; 1.48 G/438.4G; G/438.4G; G/438.4G; G/438.4G
AS DIRECTED POWDER, FOR SOLUTION ORAL
Qty: 420 MILLILITER | Refills: 0 | OUTPATIENT
Start: 2022-11-16 | End: 2022-11-17

## 2022-11-16 RX ORDER — PANTOPRAZOLE SODIUM 40 MG/1
TWICE A DAY TABLET, DELAYED RELEASE ORAL
Refills: 2 | Status: DISCONTINUED | COMMUNITY
Start: 2013-12-04

## 2022-11-16 RX ORDER — METOPROLOL TARTRATE 100 MG/1
1 TABLET WITH FOOD TABLET, FILM COATED ORAL TWICE A DAY
Status: ACTIVE | COMMUNITY
Start: 2022-11-16

## 2022-11-16 RX ORDER — FAMOTIDINE 40 MG/1
1 EVERY BEDTIME TABLET, FILM COATED ORAL
Refills: 3 | Status: ACTIVE | COMMUNITY
Start: 2022-02-18

## 2022-11-16 RX ORDER — ONDANSETRON HYDROCHLORIDE 4 MG/2ML
USE AS DIRECTED Q4-6 HOURS PRN INJECTION, SOLUTION INTRAMUSCULAR; INTRAVENOUS
Refills: 0 | Status: ON HOLD | COMMUNITY
Start: 2021-11-16

## 2022-11-16 RX ORDER — ONDANSETRON HYDROCHLORIDE 4 MG/1
1 TABLET TABLET, FILM COATED ORAL
Qty: 2 | Refills: 0 | OUTPATIENT
Start: 2022-11-16

## 2022-11-16 RX ORDER — CHLORHEXIDINE GLUCONATE 4 %
ONCE A DAY LIQUID (ML) TOPICAL ONCE A DAY
Refills: 3 | Status: ACTIVE | COMMUNITY
Start: 2013-02-01

## 2022-11-16 RX ORDER — MELOXICAM 15 MG/1
TABLET ORAL ONCE A DAY
Refills: 0 | Status: ACTIVE | COMMUNITY
Start: 2022-06-16

## 2022-11-16 RX ORDER — GABAPENTIN 300 MG/1
CAPSULE ORAL THREE TIMES A DAY
Refills: 0 | Status: ACTIVE | COMMUNITY
Start: 2022-06-16

## 2022-11-16 RX ORDER — LISINOPRIL 40 MG/1
TABLET ORAL TWICE A DAY
Refills: 0 | Status: DISCONTINUED | COMMUNITY
Start: 2022-06-16

## 2022-11-16 RX ORDER — GABAPENTIN 300 MG/1
CAPSULE ORAL THREE TIMES A DAY
Refills: 0 | Status: ON HOLD | COMMUNITY
Start: 2022-06-16

## 2022-11-16 RX ORDER — RIVAROXABAN 20 MG/1
TABLET, FILM COATED ORAL
Qty: 90 TABLET | Status: ACTIVE | COMMUNITY

## 2022-11-16 RX ORDER — OMEPRAZOLE 40 MG/1
TAKE ONCE IN THE MORNING -30 MINUTES BEFORE BREAKFAST CAPSULE, DELAYED RELEASE ORAL ONCE A DAY
Refills: 2 | Status: ON HOLD | COMMUNITY
Start: 2018-11-28

## 2022-11-16 RX ORDER — MELOXICAM 15 MG/1
TABLET ORAL ONCE A DAY
Refills: 0 | Status: ON HOLD | COMMUNITY
Start: 2022-06-16

## 2022-11-16 RX ORDER — POLYETHYLENE GLYCOL 3350, SODIUM SULFATE ANHYDROUS, SODIUM BICARBONATE, SODIUM CHLORIDE, POTASSIUM CHLORIDE 236; 22.74; 6.74; 5.86; 2.97 G/4L; G/4L; G/4L; G/4L; G/4L
TAKE AS DIRECTED POWDER, FOR SOLUTION ORAL TAKE AS DIRECTED
Refills: 0 | Status: ON HOLD | COMMUNITY
Start: 2021-11-16

## 2022-11-16 NOTE — HPI-HPI
Nuno is a pleasant 72-year-old male who presents today for a procedure follow-up.  Last visit admitted bowel regularity with high-fiber diet, supplemental fiber and MiraLAX.  Chronic reflux well-controlled on omeprazole 20 mg daily and famotidine 40 mg nightly.  Paternal history of colon polyps.  Intentional weight loss after CABG.  History of duodenal tubular adenoma.  Previous EGD suspicious for Brooke's esophagus.  Due for repeat colonoscopy in December 2022 for piecemeal polypectomy of previous cecal polyp.  EGD dated 11/2/2022 showed a duodenal polyp measuring 6 to 7 mm in size.  It is felt that the majority if not all the polyp was removed.  O2 saturations did decline requiring withdrawal of the gastroscope and Ambu bag application with a relatively quick return of O2 saturations in the mid 90s.  Irregular Z-line suspicious for Brooke's esophagus.  Small hiatal hernia.  Repeat endoscopy in 6 months to further evaluate any residual duodenal polyp formation.  Duodenal biopsies showed small bowel mucosa with microscopic adenomatous polyp with no evidence of sprue or atrophy.  GE junction negative for intestinal metaplasia   Labs dated 5/13/2022 showed normal hemoglobin.  Platelets normal.  Sodium 132.  Glucose 119.  Normal LFTs. Labs dated 6/10/2022 showed a normal CBC.  Glucose 104 but otherwise normal CMP.  Hepatitis panel negative.  AMA negative.  PT 11.9, INR 1.2.  SMA negative    Due to normal AST and NAFL patient was deferred evaluation by Dr. Bergman at this time.  Recommended patient repeat FibroScan in 2 years.    Colonoscopy dated 12/17/2021 showed a 7 mm hyperplastic polyp removed from the cecum.  Area was successfully injected with 4 mL of saline for left polypectomy.  One hemostatic clip was successfully placed.  Diminutive benign polyp removed from the mid sigmoid colon.  Nonbleeding internal hemorrhoids present upon retroflexion.  Repeat colonoscopy in 1 year for surveillance after piecemeal polypectomy    FibroScan dated 8/3/2021 showed F0/F1 and S1.  Mild hepatic steatosis without evidence of fibrosis.  Repeat scan in 1 year to evaluate for new onset fibrosis    Ultrasound dated 6/21/2022 demonstrated cholelithiasis.  No additional findings to suggest acute cholecystitis.  The liver is borderline hyperechoic and heterogeneous potentially relating to the mild steatosis described on 8/3/2021 FibroScan  Patient denies any heartburn, dysphagia while on omeprazole 20mg daily pepcid 40mg QHS.   Regular  BM w/o rectal bleeding .Occasional straining.   REcent CABG ( triple) 5/2022 .  On xarelto and denies any SOB , chest pain.

## 2022-11-16 NOTE — HPI-TODAY'S VISIT:
Nuno is a pleasant 72-year-old male who presents today for a procedure follow-up.  Last visit admitted bowel regularity with high-fiber diet, supplemental fiber and MiraLAX.  Chronic reflux well-controlled on omeprazole 20 mg daily and famotidine 40 mg nightly.  Paternal history of colon polyps.  Intentional weight loss after CABG.  History of duodenal tubular adenoma.  Previous EGD suspicious for Brooke's esophagus.  Due for repeat colonoscopy in December 2022 for piecemeal polypectomy of previous cecal polyp.  EGD dated 11/2/2022 showed a duodenal polyp measuring 6 to 7 mm in size.  It is felt that the majority if not all the polyp was removed.  O2 saturations did decline requiring withdrawal of the gastroscope and Ambu bag application with a relatively quick return of O2 saturations in the mid 90s.  Irregular Z-line suspicious for Brooke's esophagus.  Small hiatal hernia.  Repeat endoscopy in 6 months to further evaluate any residual duodenal polyp formation.  Duodenal biopsies showed small bowel mucosa with microscopic adenomatous polyp with no evidence of sprue or atrophy.  GE junction negative for intestinal metaplasia   Labs dated 5/13/2022 showed normal hemoglobin.  Platelets normal.  Sodium 132.  Glucose 119.  Normal LFTs. Labs dated 6/10/2022 showed a normal CBC.  Glucose 104 but otherwise normal CMP.  Hepatitis panel negative.  AMA negative.  PT 11.9, INR 1.2.  SMA negative    Due to normal AST and NAFL patient was deferred evaluation by Dr. Bergman at this time.  Recommended patient repeat FibroScan in 2 years.     Colonoscopy dated 12/17/2021 showed a 7 mm hyperplastic polyp removed from the cecum.  Area was successfully injected with 4 mL of saline for left polypectomy.  One hemostatic clip was successfully placed.  Diminutive benign polyp removed from the mid sigmoid colon.  Nonbleeding internal hemorrhoids present upon retroflexion.  Repeat colonoscopy in 1 year for surveillance after piecemeal polypectomy    FibroScan dated 8/3/2021 showed F0/F1 and S1.  Mild hepatic steatosis without evidence of fibrosis.  Repeat scan in 1 year to evaluate for new onset fibrosis    Ultrasound dated 6/21/2022 demonstrated cholelithiasis.  No additional findings to suggest acute cholecystitis.  The liver is borderline hyperechoic and heterogeneous potentially relating to the mild steatosis described on 8/3/2021 FibroScan

## 2022-11-23 ENCOUNTER — LAB OUTSIDE AN ENCOUNTER (OUTPATIENT)
Dept: URBAN - METROPOLITAN AREA CLINIC 63 | Facility: CLINIC | Age: 72
End: 2022-11-23

## 2022-12-07 ENCOUNTER — OFFICE VISIT (OUTPATIENT)
Dept: URBAN - METROPOLITAN AREA MEDICAL CENTER 14 | Facility: MEDICAL CENTER | Age: 72
End: 2022-12-07

## 2022-12-07 RX ORDER — ONDANSETRON HYDROCHLORIDE 4 MG/1
1 TABLET TABLET, FILM COATED ORAL
Qty: 2 | Refills: 0 | COMMUNITY
Start: 2022-11-16

## 2022-12-07 RX ORDER — LISINOPRIL 40 MG/1
TABLET ORAL TWICE A DAY
Refills: 0 | COMMUNITY
Start: 2022-06-16

## 2022-12-07 RX ORDER — ATORVASTATIN CALCIUM 20 MG/1
TABLET, FILM COATED ORAL ONCE A DAY
Refills: 0 | COMMUNITY
Start: 2022-02-18

## 2022-12-07 RX ORDER — RIVAROXABAN 20 MG/1
TABLET, FILM COATED ORAL
Qty: 90 TABLET | COMMUNITY

## 2022-12-07 RX ORDER — METOPROLOL SUCCINATE 50 MG/1
TABLET, EXTENDED RELEASE ORAL TWICE A DAY
Refills: 0 | COMMUNITY
Start: 2022-06-16

## 2022-12-07 RX ORDER — ONDANSETRON HYDROCHLORIDE 4 MG/2ML
USE AS DIRECTED Q4-6 HOURS PRN INJECTION, SOLUTION INTRAMUSCULAR; INTRAVENOUS
Refills: 0 | COMMUNITY
Start: 2021-11-16

## 2022-12-07 RX ORDER — SUCRALFATE 1 G/10ML
USE 10 ML 4 TIMES A DAY AS DIRECTED SUSPENSION ORAL
Refills: 2 | COMMUNITY
Start: 2013-04-05

## 2022-12-07 RX ORDER — FAMOTIDINE 20 MG/1
1 EVERY BEDTIME TABLET ORAL
Refills: 3 | COMMUNITY
Start: 2022-01-22

## 2022-12-07 RX ORDER — METOPROLOL TARTRATE 100 MG/1
1 TABLET WITH FOOD TABLET, FILM COATED ORAL TWICE A DAY
COMMUNITY
Start: 2022-11-16

## 2022-12-07 RX ORDER — OMEPRAZOLE 20 MG/1
TAKE ONE CAPSULE PO ONCE DAILY 30 MINUTES BEFORE BREAKFAST CAPSULE, DELAYED RELEASE ORAL
Refills: 1 | COMMUNITY
Start: 2020-06-22

## 2022-12-07 RX ORDER — GABAPENTIN 300 MG/1
CAPSULE ORAL THREE TIMES A DAY
Refills: 0 | COMMUNITY
Start: 2022-06-16

## 2022-12-07 RX ORDER — ASPIRIN 81 MG/1
TABLET, FILM COATED ORAL ONCE A DAY
Refills: 0 | COMMUNITY
Start: 2022-06-16

## 2022-12-07 RX ORDER — FAMOTIDINE 40 MG/1
1 EVERY BEDTIME TABLET, FILM COATED ORAL
Refills: 3 | COMMUNITY
Start: 2022-02-18

## 2022-12-07 RX ORDER — CHLORHEXIDINE GLUCONATE 4 %
ONCE A DAY LIQUID (ML) TOPICAL ONCE A DAY
Refills: 3 | COMMUNITY
Start: 2013-02-01

## 2022-12-07 RX ORDER — NAPROXEN SODIUM 220 MG
TAKE ONE CAPSULE BY MOUTH ONCE A DAY TABLET ORAL ONCE A DAY
Refills: 1 | COMMUNITY
Start: 2019-01-07

## 2022-12-07 RX ORDER — FUROSEMIDE 20 MG/1
TABLET ORAL ONCE A DAY
Refills: 0 | COMMUNITY
Start: 2022-06-16

## 2022-12-07 RX ORDER — MELOXICAM 15 MG/1
TABLET ORAL ONCE A DAY
Refills: 0 | COMMUNITY
Start: 2022-06-16

## 2022-12-07 RX ORDER — PANTOPRAZOLE SODIUM 40 MG/1
TWICE A DAY TABLET, DELAYED RELEASE ORAL
Refills: 2 | COMMUNITY
Start: 2013-12-04

## 2022-12-07 RX ORDER — POLYETHYLENE GLYCOL 3350, SODIUM SULFATE ANHYDROUS, SODIUM BICARBONATE, SODIUM CHLORIDE, POTASSIUM CHLORIDE 236; 22.74; 6.74; 5.86; 2.97 G/4L; G/4L; G/4L; G/4L; G/4L
TAKE AS DIRECTED POWDER, FOR SOLUTION ORAL TAKE AS DIRECTED
Refills: 0 | COMMUNITY
Start: 2021-11-16

## 2022-12-07 RX ORDER — OMEPRAZOLE 40 MG/1
TAKE ONCE IN THE MORNING -30 MINUTES BEFORE BREAKFAST CAPSULE, DELAYED RELEASE ORAL ONCE A DAY
Refills: 2 | COMMUNITY
Start: 2018-11-28

## 2022-12-07 RX ORDER — AMLODIPINE BESYLATE 5 MG/1
TABLET ORAL ONCE A DAY
Refills: 0 | COMMUNITY
Start: 2022-06-16

## 2022-12-28 ENCOUNTER — OFFICE VISIT (OUTPATIENT)
Dept: URBAN - METROPOLITAN AREA CLINIC 63 | Facility: CLINIC | Age: 72
End: 2022-12-28

## 2022-12-28 RX ORDER — ATORVASTATIN CALCIUM 20 MG/1
TABLET, FILM COATED ORAL ONCE A DAY
Refills: 0 | COMMUNITY
Start: 2022-02-18

## 2022-12-28 RX ORDER — METOPROLOL SUCCINATE 50 MG/1
TABLET, EXTENDED RELEASE ORAL TWICE A DAY
Refills: 0 | COMMUNITY
Start: 2022-06-16

## 2022-12-28 RX ORDER — ASPIRIN 81 MG/1
TABLET, FILM COATED ORAL ONCE A DAY
Refills: 0 | COMMUNITY
Start: 2022-06-16

## 2022-12-28 RX ORDER — ONDANSETRON HYDROCHLORIDE 4 MG/2ML
USE AS DIRECTED Q4-6 HOURS PRN INJECTION, SOLUTION INTRAMUSCULAR; INTRAVENOUS
Refills: 0 | COMMUNITY
Start: 2021-11-16

## 2022-12-28 RX ORDER — FUROSEMIDE 20 MG/1
TABLET ORAL ONCE A DAY
Refills: 0 | COMMUNITY
Start: 2022-06-16

## 2022-12-28 RX ORDER — GABAPENTIN 300 MG/1
CAPSULE ORAL THREE TIMES A DAY
Refills: 0 | COMMUNITY
Start: 2022-06-16

## 2022-12-28 RX ORDER — FAMOTIDINE 40 MG/1
1 EVERY BEDTIME TABLET, FILM COATED ORAL
Refills: 3 | COMMUNITY
Start: 2022-02-18

## 2022-12-28 RX ORDER — FAMOTIDINE 20 MG/1
1 EVERY BEDTIME TABLET ORAL
Refills: 3 | COMMUNITY
Start: 2022-01-22

## 2022-12-28 RX ORDER — SUCRALFATE 1 G/10ML
USE 10 ML 4 TIMES A DAY AS DIRECTED SUSPENSION ORAL
Refills: 2 | COMMUNITY
Start: 2013-04-05

## 2022-12-28 RX ORDER — OMEPRAZOLE 20 MG/1
TAKE ONE CAPSULE PO ONCE DAILY 30 MINUTES BEFORE BREAKFAST CAPSULE, DELAYED RELEASE ORAL
Refills: 1 | COMMUNITY
Start: 2020-06-22

## 2022-12-28 RX ORDER — OMEPRAZOLE 40 MG/1
TAKE ONCE IN THE MORNING -30 MINUTES BEFORE BREAKFAST CAPSULE, DELAYED RELEASE ORAL ONCE A DAY
Refills: 2 | COMMUNITY
Start: 2018-11-28

## 2022-12-28 RX ORDER — AMLODIPINE BESYLATE 5 MG/1
TABLET ORAL ONCE A DAY
Refills: 0 | COMMUNITY
Start: 2022-06-16

## 2022-12-28 RX ORDER — PANTOPRAZOLE SODIUM 40 MG/1
TWICE A DAY TABLET, DELAYED RELEASE ORAL
Refills: 2 | COMMUNITY
Start: 2013-12-04

## 2022-12-28 RX ORDER — NAPROXEN SODIUM 220 MG
TAKE ONE CAPSULE BY MOUTH ONCE A DAY TABLET ORAL ONCE A DAY
Refills: 1 | COMMUNITY
Start: 2019-01-07

## 2022-12-28 RX ORDER — ONDANSETRON HYDROCHLORIDE 4 MG/1
1 TABLET TABLET, FILM COATED ORAL
Qty: 2 | Refills: 0 | COMMUNITY
Start: 2022-11-16

## 2022-12-28 RX ORDER — MELOXICAM 15 MG/1
TABLET ORAL ONCE A DAY
Refills: 0 | COMMUNITY
Start: 2022-06-16

## 2022-12-28 RX ORDER — RIVAROXABAN 20 MG/1
TABLET, FILM COATED ORAL
Qty: 90 TABLET | COMMUNITY

## 2022-12-28 RX ORDER — LISINOPRIL 40 MG/1
TABLET ORAL TWICE A DAY
Refills: 0 | COMMUNITY
Start: 2022-06-16

## 2022-12-28 RX ORDER — POLYETHYLENE GLYCOL 3350, SODIUM SULFATE ANHYDROUS, SODIUM BICARBONATE, SODIUM CHLORIDE, POTASSIUM CHLORIDE 236; 22.74; 6.74; 5.86; 2.97 G/4L; G/4L; G/4L; G/4L; G/4L
TAKE AS DIRECTED POWDER, FOR SOLUTION ORAL TAKE AS DIRECTED
Refills: 0 | COMMUNITY
Start: 2021-11-16

## 2022-12-28 RX ORDER — METOPROLOL TARTRATE 100 MG/1
1 TABLET WITH FOOD TABLET, FILM COATED ORAL TWICE A DAY
COMMUNITY
Start: 2022-11-16

## 2022-12-28 RX ORDER — CHLORHEXIDINE GLUCONATE 4 %
ONCE A DAY LIQUID (ML) TOPICAL ONCE A DAY
Refills: 3 | COMMUNITY
Start: 2013-02-01

## 2023-01-19 ENCOUNTER — ERX REFILL RESPONSE (OUTPATIENT)
Dept: URBAN - METROPOLITAN AREA CLINIC 63 | Facility: CLINIC | Age: 73
End: 2023-01-19

## 2023-01-19 RX ORDER — FAMOTIDINE 40 MG/1
1 EVERY BEDTIME TABLET, FILM COATED ORAL
Refills: 3 | OUTPATIENT

## 2023-01-19 RX ORDER — FAMOTIDINE 40 MG/1
TAKE 1 TABLET AT BEDTIME TABLET, FILM COATED ORAL
Qty: 90 TABLET | Refills: 3 | OUTPATIENT

## 2023-02-02 ENCOUNTER — TELEPHONE ENCOUNTER (OUTPATIENT)
Dept: URBAN - METROPOLITAN AREA CLINIC 63 | Facility: CLINIC | Age: 73
End: 2023-02-02

## 2023-02-09 ENCOUNTER — TELEPHONE ENCOUNTER (OUTPATIENT)
Dept: URBAN - METROPOLITAN AREA CLINIC 60 | Facility: CLINIC | Age: 73
End: 2023-02-09

## 2023-02-13 ENCOUNTER — ERX REFILL RESPONSE (OUTPATIENT)
Dept: URBAN - METROPOLITAN AREA CLINIC 63 | Facility: CLINIC | Age: 73
End: 2023-02-13

## 2023-02-13 RX ORDER — POLYETHYLENE GLYCOL 3350, SODIUM CHLORIDE, SODIUM BICARBONATE, POTASSIUM CHLORIDE 420; 11.2; 5.72; 1.48 G/4L; G/4L; G/4L; G/4L
AS DIRECTED POWDER, FOR SOLUTION ORAL
Qty: 4000 MILLILITER | Refills: 0 | OUTPATIENT

## 2023-03-01 ENCOUNTER — OFFICE VISIT (OUTPATIENT)
Dept: URBAN - METROPOLITAN AREA MEDICAL CENTER 14 | Facility: MEDICAL CENTER | Age: 73
End: 2023-03-01
Payer: MEDICARE

## 2023-03-01 DIAGNOSIS — K64.0 GRADE I INTERNAL HEMORRHOIDS: ICD-10-CM

## 2023-03-01 DIAGNOSIS — Z86.010 HX OF COLONIC POLYP: ICD-10-CM

## 2023-03-01 DIAGNOSIS — K57.30 DIVERTCULOSIS OF LG INT W/O PERFORATION OR ABSCESS W/O BLEEDING: ICD-10-CM

## 2023-03-01 PROCEDURE — G0105 COLORECTAL SCRN; HI RISK IND: HCPCS | Performed by: INTERNAL MEDICINE

## 2023-03-01 RX ORDER — GABAPENTIN 300 MG/1
CAPSULE ORAL THREE TIMES A DAY
Refills: 0 | COMMUNITY
Start: 2022-06-16

## 2023-03-01 RX ORDER — FAMOTIDINE 40 MG/1
TAKE 1 TABLET AT BEDTIME TABLET, FILM COATED ORAL
Qty: 90 TABLET | Refills: 3 | Status: ACTIVE | COMMUNITY

## 2023-03-01 RX ORDER — POLYETHYLENE GLYCOL 3350, SODIUM SULFATE ANHYDROUS, SODIUM BICARBONATE, SODIUM CHLORIDE, POTASSIUM CHLORIDE 236; 22.74; 6.74; 5.86; 2.97 G/4L; G/4L; G/4L; G/4L; G/4L
TAKE AS DIRECTED POWDER, FOR SOLUTION ORAL TAKE AS DIRECTED
Refills: 0 | COMMUNITY
Start: 2021-11-16

## 2023-03-01 RX ORDER — METOPROLOL TARTRATE 100 MG/1
1 TABLET WITH FOOD TABLET, FILM COATED ORAL TWICE A DAY
COMMUNITY
Start: 2022-11-16

## 2023-03-01 RX ORDER — FUROSEMIDE 20 MG/1
TABLET ORAL ONCE A DAY
Refills: 0 | COMMUNITY
Start: 2022-06-16

## 2023-03-01 RX ORDER — CHLORHEXIDINE GLUCONATE 4 %
ONCE A DAY LIQUID (ML) TOPICAL ONCE A DAY
Refills: 3 | COMMUNITY
Start: 2013-02-01

## 2023-03-01 RX ORDER — POLYETHYLENE GLYCOL 3350, SODIUM CHLORIDE, SODIUM BICARBONATE, POTASSIUM CHLORIDE 420; 11.2; 5.72; 1.48 G/4L; G/4L; G/4L; G/4L
AS DIRECTED POWDER, FOR SOLUTION ORAL
Qty: 4000 MILLILITER | Refills: 0 | Status: ACTIVE | COMMUNITY

## 2023-03-01 RX ORDER — ONDANSETRON HYDROCHLORIDE 4 MG/2ML
USE AS DIRECTED Q4-6 HOURS PRN INJECTION, SOLUTION INTRAMUSCULAR; INTRAVENOUS
Refills: 0 | COMMUNITY
Start: 2021-11-16

## 2023-03-01 RX ORDER — OMEPRAZOLE 20 MG/1
TAKE ONE CAPSULE PO ONCE DAILY 30 MINUTES BEFORE BREAKFAST CAPSULE, DELAYED RELEASE ORAL
Refills: 1 | COMMUNITY
Start: 2020-06-22

## 2023-03-01 RX ORDER — LISINOPRIL 40 MG/1
TABLET ORAL TWICE A DAY
Refills: 0 | COMMUNITY
Start: 2022-06-16

## 2023-03-01 RX ORDER — RIVAROXABAN 20 MG/1
TABLET, FILM COATED ORAL
Qty: 90 TABLET | COMMUNITY

## 2023-03-01 RX ORDER — OMEPRAZOLE 40 MG/1
TAKE ONCE IN THE MORNING -30 MINUTES BEFORE BREAKFAST CAPSULE, DELAYED RELEASE ORAL ONCE A DAY
Refills: 2 | COMMUNITY
Start: 2018-11-28

## 2023-03-01 RX ORDER — AMLODIPINE BESYLATE 5 MG/1
TABLET ORAL ONCE A DAY
Refills: 0 | COMMUNITY
Start: 2022-06-16

## 2023-03-01 RX ORDER — PANTOPRAZOLE SODIUM 40 MG/1
TWICE A DAY TABLET, DELAYED RELEASE ORAL
Refills: 2 | COMMUNITY
Start: 2013-12-04

## 2023-03-01 RX ORDER — SUCRALFATE 1 G/10ML
USE 10 ML 4 TIMES A DAY AS DIRECTED SUSPENSION ORAL
Refills: 2 | COMMUNITY
Start: 2013-04-05

## 2023-03-01 RX ORDER — FAMOTIDINE 20 MG/1
1 EVERY BEDTIME TABLET ORAL
Refills: 3 | COMMUNITY
Start: 2022-01-22

## 2023-03-01 RX ORDER — NAPROXEN SODIUM 220 MG
TAKE ONE CAPSULE BY MOUTH ONCE A DAY TABLET ORAL ONCE A DAY
Refills: 1 | COMMUNITY
Start: 2019-01-07

## 2023-03-01 RX ORDER — ASPIRIN 81 MG/1
TABLET, FILM COATED ORAL ONCE A DAY
Refills: 0 | COMMUNITY
Start: 2022-06-16

## 2023-03-01 RX ORDER — METOPROLOL SUCCINATE 50 MG/1
TABLET, EXTENDED RELEASE ORAL TWICE A DAY
Refills: 0 | COMMUNITY
Start: 2022-06-16

## 2023-03-01 RX ORDER — ATORVASTATIN CALCIUM 20 MG/1
TABLET, FILM COATED ORAL ONCE A DAY
Refills: 0 | COMMUNITY
Start: 2022-02-18

## 2023-03-01 RX ORDER — MELOXICAM 15 MG/1
TABLET ORAL ONCE A DAY
Refills: 0 | COMMUNITY
Start: 2022-06-16

## 2023-03-01 RX ORDER — ONDANSETRON HYDROCHLORIDE 4 MG/1
1 TABLET TABLET, FILM COATED ORAL
Qty: 2 | Refills: 0 | COMMUNITY
Start: 2022-11-16

## 2023-03-03 PROBLEM — 398050005 DIVERTICULAR DISEASE OF COLON: Status: ACTIVE | Noted: 2023-03-03

## 2023-03-03 PROBLEM — 428283002: Status: ACTIVE | Noted: 2022-11-16

## 2023-03-20 ENCOUNTER — OFFICE VISIT (OUTPATIENT)
Dept: URBAN - METROPOLITAN AREA CLINIC 60 | Facility: CLINIC | Age: 73
End: 2023-03-20

## 2023-03-27 ENCOUNTER — LAB OUTSIDE AN ENCOUNTER (OUTPATIENT)
Dept: URBAN - METROPOLITAN AREA CLINIC 60 | Facility: CLINIC | Age: 73
End: 2023-03-27

## 2023-03-27 ENCOUNTER — OFFICE VISIT (OUTPATIENT)
Dept: URBAN - METROPOLITAN AREA CLINIC 60 | Facility: CLINIC | Age: 73
End: 2023-03-27
Payer: MEDICARE

## 2023-03-27 VITALS
TEMPERATURE: 97.7 F | OXYGEN SATURATION: 97 % | WEIGHT: 237 LBS | BODY MASS INDEX: 33.18 KG/M2 | SYSTOLIC BLOOD PRESSURE: 120 MMHG | HEART RATE: 61 BPM | DIASTOLIC BLOOD PRESSURE: 88 MMHG | RESPIRATION RATE: 20 BRPM | HEIGHT: 71 IN

## 2023-03-27 DIAGNOSIS — K76.0 FATTY LIVER: ICD-10-CM

## 2023-03-27 DIAGNOSIS — Z86.010 HX OF COLONIC POLYP: ICD-10-CM

## 2023-03-27 DIAGNOSIS — K21.9 GASTROESOPHAGEAL REFLUX DISEASE WITHOUT ESOPHAGITIS: ICD-10-CM

## 2023-03-27 DIAGNOSIS — D13.2 DUODENAL ADENOMA: ICD-10-CM

## 2023-03-27 DIAGNOSIS — Z83.71 FAMILY HISTORY OF COLONIC POLYPS: ICD-10-CM

## 2023-03-27 PROCEDURE — 99214 OFFICE O/P EST MOD 30 MIN: CPT | Performed by: PHYSICIAN ASSISTANT

## 2023-03-27 RX ORDER — OMEPRAZOLE 20 MG/1
TAKE ONE CAPSULE PO ONCE DAILY 30 MINUTES BEFORE BREAKFAST CAPSULE, DELAYED RELEASE ORAL
Refills: 1 | Status: ACTIVE | COMMUNITY
Start: 2020-06-22

## 2023-03-27 RX ORDER — LISINOPRIL 40 MG/1
TABLET ORAL TWICE A DAY
Refills: 0 | Status: ACTIVE | COMMUNITY
Start: 2022-06-16

## 2023-03-27 RX ORDER — AMLODIPINE BESYLATE 10 MG/1
1 TABLET TABLET ORAL ONCE A DAY
Status: ACTIVE | COMMUNITY

## 2023-03-27 RX ORDER — TRAZODONE HYDROCHLORIDE 150 MG/1
1 TABLET AT BEDTIME TABLET ORAL ONCE A DAY
Status: ACTIVE | COMMUNITY

## 2023-03-27 RX ORDER — ASPIRIN 81 MG/1
TABLET, FILM COATED ORAL ONCE A DAY
Refills: 0 | Status: ACTIVE | COMMUNITY
Start: 2022-06-16

## 2023-03-27 RX ORDER — FAMOTIDINE 40 MG/1
1 TABLET AT BEDTIME TABLET, FILM COATED ORAL ONCE A DAY
Status: ACTIVE | COMMUNITY

## 2023-03-27 RX ORDER — METOPROLOL TARTRATE 100 MG/1
1 TABLET WITH FOOD TABLET, FILM COATED ORAL TWICE A DAY
Status: ACTIVE | COMMUNITY

## 2023-04-03 ENCOUNTER — LAB OUTSIDE AN ENCOUNTER (OUTPATIENT)
Dept: URBAN - METROPOLITAN AREA CLINIC 60 | Facility: CLINIC | Age: 73
End: 2023-04-03

## 2023-05-15 ENCOUNTER — OFFICE VISIT (OUTPATIENT)
Dept: URBAN - METROPOLITAN AREA MEDICAL CENTER 3 | Facility: MEDICAL CENTER | Age: 73
End: 2023-05-15
Payer: MEDICARE

## 2023-05-15 DIAGNOSIS — K44.9 HIATAL HERNIA: ICD-10-CM

## 2023-05-15 DIAGNOSIS — K22.70 BARRETT'S ESOPHAGUS WITHOUT DYSPLASIA: ICD-10-CM

## 2023-05-15 DIAGNOSIS — K31.7 POLYP OF STOMACH AND DUODENUM: ICD-10-CM

## 2023-05-15 DIAGNOSIS — K29.70 GASTRITIS: ICD-10-CM

## 2023-05-15 PROCEDURE — 43239 EGD BIOPSY SINGLE/MULTIPLE: CPT | Performed by: INTERNAL MEDICINE

## 2023-05-15 RX ORDER — ASPIRIN 81 MG/1
TABLET, FILM COATED ORAL ONCE A DAY
Refills: 0 | Status: ACTIVE | COMMUNITY
Start: 2022-06-16

## 2023-05-15 RX ORDER — AMLODIPINE BESYLATE 10 MG/1
1 TABLET TABLET ORAL ONCE A DAY
Status: ACTIVE | COMMUNITY

## 2023-05-15 RX ORDER — TRAZODONE HYDROCHLORIDE 150 MG/1
1 TABLET AT BEDTIME TABLET ORAL ONCE A DAY
Status: ACTIVE | COMMUNITY

## 2023-05-15 RX ORDER — METOPROLOL TARTRATE 100 MG/1
1 TABLET WITH FOOD TABLET, FILM COATED ORAL TWICE A DAY
Status: ACTIVE | COMMUNITY

## 2023-05-15 RX ORDER — OMEPRAZOLE 20 MG/1
TAKE ONE CAPSULE PO ONCE DAILY 30 MINUTES BEFORE BREAKFAST CAPSULE, DELAYED RELEASE ORAL
Refills: 1 | Status: ACTIVE | COMMUNITY
Start: 2020-06-22

## 2023-05-15 RX ORDER — FAMOTIDINE 40 MG/1
1 TABLET AT BEDTIME TABLET, FILM COATED ORAL ONCE A DAY
Status: ACTIVE | COMMUNITY

## 2023-05-15 RX ORDER — LISINOPRIL 40 MG/1
TABLET ORAL TWICE A DAY
Refills: 0 | Status: ACTIVE | COMMUNITY
Start: 2022-06-16

## 2023-06-05 ENCOUNTER — OFFICE VISIT (OUTPATIENT)
Dept: URBAN - METROPOLITAN AREA CLINIC 60 | Facility: CLINIC | Age: 73
End: 2023-06-05
Payer: MEDICARE

## 2023-06-05 ENCOUNTER — DASHBOARD ENCOUNTERS (OUTPATIENT)
Age: 73
End: 2023-06-05

## 2023-06-05 VITALS
HEART RATE: 67 BPM | WEIGHT: 250 LBS | SYSTOLIC BLOOD PRESSURE: 128 MMHG | BODY MASS INDEX: 35 KG/M2 | OXYGEN SATURATION: 97 % | DIASTOLIC BLOOD PRESSURE: 80 MMHG | TEMPERATURE: 97.7 F | RESPIRATION RATE: 20 BRPM | HEIGHT: 71 IN

## 2023-06-05 DIAGNOSIS — Z86.010 HX OF COLONIC POLYP: ICD-10-CM

## 2023-06-05 DIAGNOSIS — Z83.71 FAMILY HISTORY OF COLONIC POLYPS: ICD-10-CM

## 2023-06-05 DIAGNOSIS — K22.70 BARRETT'S ESOPHAGUS WITHOUT DYSPLASIA: ICD-10-CM

## 2023-06-05 DIAGNOSIS — K21.9 GASTROESOPHAGEAL REFLUX DISEASE WITHOUT ESOPHAGITIS: ICD-10-CM

## 2023-06-05 DIAGNOSIS — K76.0 FATTY LIVER: ICD-10-CM

## 2023-06-05 DIAGNOSIS — D13.2 DUODENAL ADENOMA: ICD-10-CM

## 2023-06-05 PROBLEM — 302914006: Status: ACTIVE | Noted: 2023-06-05

## 2023-06-05 PROCEDURE — 99213 OFFICE O/P EST LOW 20 MIN: CPT | Performed by: PHYSICIAN ASSISTANT

## 2023-06-05 RX ORDER — TRAZODONE HYDROCHLORIDE 150 MG/1
1 TABLET AT BEDTIME TABLET ORAL ONCE A DAY
Status: ACTIVE | COMMUNITY

## 2023-06-05 RX ORDER — LISINOPRIL 40 MG/1
TABLET ORAL TWICE A DAY
Refills: 0 | Status: ACTIVE | COMMUNITY
Start: 2022-06-16

## 2023-06-05 RX ORDER — FAMOTIDINE 40 MG/1
1 TABLET AT BEDTIME TABLET, FILM COATED ORAL ONCE A DAY
Status: ACTIVE | COMMUNITY

## 2023-06-05 RX ORDER — METOPROLOL TARTRATE 100 MG/1
1 TABLET WITH FOOD TABLET, FILM COATED ORAL TWICE A DAY
Status: ACTIVE | COMMUNITY

## 2023-06-05 RX ORDER — QUETIAPINE 100 MG/1
1 TABLET AT BEDTIME TABLET, FILM COATED ORAL ONCE A DAY
Status: ACTIVE | COMMUNITY

## 2023-06-05 RX ORDER — OMEPRAZOLE 20 MG/1
TAKE ONE CAPSULE PO ONCE DAILY 30 MINUTES BEFORE BREAKFAST CAPSULE, DELAYED RELEASE ORAL
Refills: 1 | Status: ACTIVE | COMMUNITY
Start: 2020-06-22

## 2023-06-05 RX ORDER — ASPIRIN 81 MG/1
TABLET, FILM COATED ORAL ONCE A DAY
Refills: 0 | Status: ACTIVE | COMMUNITY
Start: 2022-06-16

## 2023-06-05 RX ORDER — AMLODIPINE BESYLATE 10 MG/1
1 TABLET TABLET ORAL ONCE A DAY
Status: ACTIVE | COMMUNITY

## 2023-06-05 NOTE — HPI-TODAY'S VISIT:
Nuno is a pleasant 73-year-old male who presents today for a procedure follow-up. History of duodenal tubular adenoma.  EGD dated 5/15/2023 showed possible small amount of residual polyp tissue in the first portion of the duodenum status post cold biopsy.  Gastritis.  Hiatal hernia.  Concern for Brooke's esophagus.  Repeat endoscopy in a year for further continued surveillance of duodenal polyp.  Pathology demonstrated chronic duodenitis with foveolar metaplasia and reactive changes.  No villous atrophy, parasites or dysplasia seen.  Mild chronic gastritis negative for H. pylori.  GE junction mucosa demonstrating intestinal metaplasia but negative for dysplasia or malignancy  FibroScan dated 12/13/2022 showed S2 and F0  Colonoscopy dated 3/1/2023 showed left-sided diverticulosis coli.  Mild to moderate internal hemorrhoids.  Repeat colonoscopy in 5 years   Labs dated 5/13/2022 showed normal hemoglobin.  Platelets normal.  Sodium 132.  Glucose 119.  Normal LFTs. Labs dated 6/10/2022 showed a normal CBC.  Glucose 104 but otherwise normal CMP.  Hepatitis panel negative.  AMA negative.  PT 11.9, INR 1.2.  SMA negative    Due to normal AST and NAFL patient was deferred evaluation by Dr. Bergman at this time.  Recommended patient repeat FibroScan in 2 years.     Ultrasound dated 6/21/2022 demonstrated cholelithiasis.  No additional findings to suggest acute cholecystitis.  The liver is borderline hyperechoic and heterogeneous potentially relating to the mild steatosis described on 8/3/2021 FibroScan  No issues after procedure. Regular bowel movements with use of high fiber diet, supplemental fiber and MiraLAX. Doing well with omeprazole 20 mg once daily and famotidine 20 mg qhs. Reflux well controlled. Denies any nausea, vomiting, dysphagia, odynophagia, hematemesis, coffee ground emesis, abdominal pain, change in bowel habits, abnormal weight loss, BRBPR or melena. Denies any family history of colon cancer but admits to paternal history colon polyps which were non advanced. Notes no other GI complaints at this time. Weight loss intentional after CABG
